# Patient Record
Sex: FEMALE | Race: WHITE | NOT HISPANIC OR LATINO | Employment: FULL TIME | URBAN - METROPOLITAN AREA
[De-identification: names, ages, dates, MRNs, and addresses within clinical notes are randomized per-mention and may not be internally consistent; named-entity substitution may affect disease eponyms.]

---

## 2021-04-29 ENCOUNTER — PATIENT MESSAGE (OUTPATIENT)
Dept: RESEARCH | Facility: HOSPITAL | Age: 41
End: 2021-04-29

## 2022-07-08 ENCOUNTER — PATIENT MESSAGE (OUTPATIENT)
Dept: FAMILY MEDICINE | Facility: CLINIC | Age: 42
End: 2022-07-08
Payer: COMMERCIAL

## 2022-12-23 ENCOUNTER — PATIENT MESSAGE (OUTPATIENT)
Dept: FAMILY MEDICINE | Facility: CLINIC | Age: 42
End: 2022-12-23
Payer: COMMERCIAL

## 2022-12-27 ENCOUNTER — OFFICE VISIT (OUTPATIENT)
Dept: UROLOGY | Facility: CLINIC | Age: 42
End: 2022-12-27

## 2022-12-27 VITALS — BODY MASS INDEX: 27.67 KG/M2 | HEIGHT: 66 IN | WEIGHT: 172.19 LBS

## 2022-12-27 DIAGNOSIS — N39.41 URGE INCONTINENCE: ICD-10-CM

## 2022-12-27 DIAGNOSIS — R35.0 URINARY FREQUENCY: ICD-10-CM

## 2022-12-27 DIAGNOSIS — Z90.5 HISTORY OF PARTIAL NEPHRECTOMY: ICD-10-CM

## 2022-12-27 DIAGNOSIS — R39.15 URINARY URGENCY: Primary | ICD-10-CM

## 2022-12-27 PROCEDURE — 99999 PR PBB SHADOW E&M-EST. PATIENT-LVL III: CPT | Mod: PBBFAC,,,

## 2022-12-27 PROCEDURE — 99213 OFFICE O/P EST LOW 20 MIN: CPT | Mod: PBBFAC,PO

## 2022-12-27 PROCEDURE — 99204 OFFICE O/P NEW MOD 45 MIN: CPT | Mod: S$PBB,,,

## 2022-12-27 PROCEDURE — 99999 PR PBB SHADOW E&M-EST. PATIENT-LVL III: ICD-10-PCS | Mod: PBBFAC,,,

## 2022-12-27 PROCEDURE — 99204 PR OFFICE/OUTPT VISIT, NEW, LEVL IV, 45-59 MIN: ICD-10-PCS | Mod: S$PBB,,,

## 2022-12-27 RX ORDER — OXYBUTYNIN CHLORIDE 5 MG/1
5 TABLET, EXTENDED RELEASE ORAL DAILY
Qty: 90 TABLET | Refills: 3 | Status: SHIPPED | OUTPATIENT
Start: 2022-12-27 | End: 2023-12-27

## 2022-12-27 NOTE — PROGRESS NOTES
[facility] Note  Staff: Mady Fuchs FNP-C    PCP: MD Edmund    Chief Complaint: Establish Care, History of Right Partial Nephrectomy    Subjective:        HPI: Twyla Ramos is a 42 y.o. female NEW PATIENT presents today to [facility]. She has a history of a right partial nephrectomy for RCC in 2019. This procedure was done in [place] and the details are not known or available. She never followed up with surgeon after the procedure. She has not had imaging done. 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 She denies dysuria, hematuria, fever, flank pain, and difficulty urinating. She does experience urgency, frequency, and urge incontinence. She used to take oxybutynin and she is requesting a new prescription. She denies a history of kidney stones. She denies a family history of  cancer. Her most recent labs from 11/28/22 show eGFR >80 and creat 0.6.     Questions asked pt during office visit today:  DTF: every 1-2 hours, NTF: 1-2 x night  Urgency:Yes , incontinence with urgency? Yes ;   Incontinence with laughing or straining: Yes;   DysuriaNo  Gross HematuriaNo  History of UTI: No    History of Kidney Stones?:  No    Constipation issues?:  No    REVIEW OF SYSTEMS:  Review of Systems   Constitutional: Negative.  Negative for chills and fever.   HENT: Negative.     Eyes: Negative.    Respiratory: Negative.     Cardiovascular: Negative.    Gastrointestinal: Negative.  Negative for abdominal pain, nausea and vomiting.   Genitourinary:  Positive for frequency and urgency. Negative for dysuria, flank pain and hematuria.   Musculoskeletal: Negative.  Negative for back pain.   Skin: Negative.    Neurological: Negative.    Endo/Heme/Allergies: Negative.    Psychiatric/Behavioral: Negative.       PMHx:  Past Medical History:   Diagnosis Date    Abnormal Pap smear 2001    cervical laser/HPV    Herpes genital        PSHx:  Past Surgical History:   Procedure  Laterality Date    CHOLECYSTECTOMY      left shoulder and elbow surgery  2012    polyp removal      colon age 5 and 10    THYROIDECTOMY, PARTIAL  2001    TUBAL LIGATION         Fam Hx:   malignancies: No , gyn malignancies: No   kidney stones: No     Soc Hx:  Lives in [place]    Allergies:  Azithromycin    Medications: reviewed     Objective:   There were no vitals filed for this visit.    Physical Exam  Constitutional:       Appearance: Normal appearance.   HENT:      Head: Normocephalic.      Mouth/Throat:      Mouth: Mucous membranes are moist.   Eyes:      Conjunctiva/sclera: Conjunctivae normal.   Pulmonary:      Effort: Pulmonary effort is normal.   Abdominal:      General: There is no distension.      Palpations: Abdomen is soft.      Tenderness: There is no abdominal tenderness. There is no right CVA tenderness or left CVA tenderness.   Musculoskeletal:         General: Normal range of motion.      Cervical back: Normal range of motion.   Skin:     General: Skin is warm.   Neurological:      Mental Status: She is alert and oriented to person, place, and time.   Psychiatric:         Mood and Affect: Mood normal.         Behavior: Behavior normal.         LABS REVIEW:  UA today:   Color:Clear, Yellow  Spec. Grav.  1.025  PH  5.5  Negative for leukocytes, nitrates, protein, glucose, ketones, urobili, bili  Small blood    Assessment:       1. Urinary urgency    2. Urinary frequency    3. Urge incontinence    4. History of partial nephrectomy          Plan:      CT abd and pelvis w wo contrast ordered and scheduled  Oxybutynin 5mg XL prescribed to pt today as trial to see if med improves pt's current LUTS.  Benefits, risks and side affects were thoroughly explained to pt today in office with all questions answered.  Order given to pt for pelvic floor PT    F/u Annually or As Needed per Treatment Plan    [facility]: Active    KAYLA Crain

## 2023-01-05 ENCOUNTER — HOSPITAL ENCOUNTER (OUTPATIENT)
Dept: RADIOLOGY | Facility: HOSPITAL | Age: 43
Discharge: HOME | End: 2023-01-05

## 2023-01-05 DIAGNOSIS — Z90.5 HISTORY OF PARTIAL NEPHRECTOMY: ICD-10-CM

## 2023-01-05 PROCEDURE — A9698 NON-RAD CONTRAST MATERIALNOC: HCPCS | Mod: PO

## 2023-01-05 PROCEDURE — 74177 CT ABDOMEN PELVIS WITH CONTRAST: ICD-10-PCS | Mod: 26,,, | Performed by: RADIOLOGY

## 2023-01-05 PROCEDURE — 25500020 PHARM REV CODE 255: Mod: PO

## 2023-01-05 PROCEDURE — 74178 CT ABD&PLV WO CNTR FLWD CNTR: CPT | Mod: TC,PO

## 2023-01-05 PROCEDURE — 74177 CT ABD & PELVIS W/CONTRAST: CPT | Mod: TC,PO

## 2023-01-05 PROCEDURE — 74177 CT ABD & PELVIS W/CONTRAST: CPT | Mod: 26,,, | Performed by: RADIOLOGY

## 2023-01-05 RX ADMIN — IOHEXOL 75 ML: 350 INJECTION, SOLUTION INTRAVENOUS at 02:01

## 2023-01-05 RX ADMIN — BARIUM SULFATE 900 ML: 20 SUSPENSION ORAL at 03:01

## 2023-01-06 ENCOUNTER — PATIENT MESSAGE (OUTPATIENT)
Dept: UROLOGY | Facility: CLINIC | Age: 43
End: 2023-01-06

## 2023-01-12 ENCOUNTER — HOSPITAL ENCOUNTER (OUTPATIENT)
Dept: RADIOLOGY | Facility: HOSPITAL | Age: 43
Discharge: HOME | End: 2023-01-12

## 2023-01-12 DIAGNOSIS — Z90.5 HISTORY OF PARTIAL NEPHRECTOMY: Primary | ICD-10-CM

## 2023-01-12 DIAGNOSIS — Z90.5 HISTORY OF PARTIAL NEPHRECTOMY: ICD-10-CM

## 2023-01-12 PROCEDURE — 74176 CT ABDOMEN PELVIS WITHOUT CONTRAST: ICD-10-PCS | Mod: 26,,, | Performed by: RADIOLOGY

## 2023-01-12 PROCEDURE — 74176 CT ABD & PELVIS W/O CONTRAST: CPT | Mod: TC,PO

## 2023-01-12 PROCEDURE — 74176 CT ABD & PELVIS W/O CONTRAST: CPT | Mod: 26,,, | Performed by: RADIOLOGY

## 2024-04-11 ENCOUNTER — OFFICE VISIT (OUTPATIENT)
Dept: UROLOGY | Facility: CLINIC | Age: 44
End: 2024-04-11
Payer: COMMERCIAL

## 2024-04-11 VITALS — WEIGHT: 186.06 LBS | HEIGHT: 66 IN | BODY MASS INDEX: 29.9 KG/M2

## 2024-04-11 DIAGNOSIS — Z90.5 HISTORY OF PARTIAL NEPHRECTOMY: ICD-10-CM

## 2024-04-11 DIAGNOSIS — A60.00 HERPES SIMPLEX INFECTION OF GENITOURINARY SYSTEM: ICD-10-CM

## 2024-04-11 DIAGNOSIS — R39.15 URINARY URGENCY: Primary | ICD-10-CM

## 2024-04-11 DIAGNOSIS — N32.81 OAB (OVERACTIVE BLADDER): ICD-10-CM

## 2024-04-11 DIAGNOSIS — Z72.0 TOBACCO ABUSE: ICD-10-CM

## 2024-04-11 LAB
BILIRUBIN, UA POC OHS: NEGATIVE
BLOOD, UA POC OHS: ABNORMAL
CLARITY, UA POC OHS: CLEAR
COLOR, UA POC OHS: YELLOW
GLUCOSE, UA POC OHS: NEGATIVE
KETONES, UA POC OHS: NEGATIVE
LEUKOCYTES, UA POC OHS: ABNORMAL
NITRITE, UA POC OHS: NEGATIVE
PH, UA POC OHS: 6
PROTEIN, UA POC OHS: NEGATIVE
SPECIFIC GRAVITY, UA POC OHS: 1.02
UROBILINOGEN, UA POC OHS: 0.2

## 2024-04-11 PROCEDURE — 99214 OFFICE O/P EST MOD 30 MIN: CPT | Mod: S$GLB,,,

## 2024-04-11 PROCEDURE — 99999 PR PBB SHADOW E&M-EST. PATIENT-LVL III: CPT | Mod: PBBFAC,,,

## 2024-04-11 PROCEDURE — 3008F BODY MASS INDEX DOCD: CPT | Mod: CPTII,S$GLB,,

## 2024-04-11 PROCEDURE — 1159F MED LIST DOCD IN RCRD: CPT | Mod: CPTII,S$GLB,,

## 2024-04-11 PROCEDURE — 1160F RVW MEDS BY RX/DR IN RCRD: CPT | Mod: CPTII,S$GLB,,

## 2024-04-11 PROCEDURE — 81003 URINALYSIS AUTO W/O SCOPE: CPT | Mod: QW,S$GLB,,

## 2024-04-11 RX ORDER — OXYBUTYNIN CHLORIDE 5 MG/1
5 TABLET, EXTENDED RELEASE ORAL DAILY
Qty: 30 TABLET | Refills: 11 | Status: SHIPPED | OUTPATIENT
Start: 2024-04-11 | End: 2025-04-11

## 2024-04-11 RX ORDER — VARENICLINE TARTRATE 0.5 (11)-1
KIT ORAL
Qty: 1 EACH | Refills: 0 | Status: SHIPPED | OUTPATIENT
Start: 2024-04-11 | End: 2024-05-28 | Stop reason: DRUGHIGH

## 2024-04-11 RX ORDER — VALACYCLOVIR HYDROCHLORIDE 1 G/1
1000 TABLET, FILM COATED ORAL DAILY
Qty: 30 TABLET | Refills: 11 | Status: SHIPPED | OUTPATIENT
Start: 2024-04-11 | End: 2025-04-11

## 2024-04-11 RX ORDER — VALACYCLOVIR HYDROCHLORIDE 500 MG/1
500 TABLET, FILM COATED ORAL
COMMUNITY

## 2024-04-11 NOTE — PROGRESS NOTES
[facility] Note  Staff: Mady Fuchs FNP-C    PCP:  None    Chief Complaint:  Annual exam    Subjective:        HPI: Twyla Ramos is a 43 y.o. female presents today for annual exam. She has a history of a right partial nephrectomy for RCC in 2019. This procedure was done in [place] and the details are not known or available. She never followed up with surgeon after the procedure. 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 Her last imaging was done 1/12/2023 which showed Prior partial right nephrectomy. Suture material seen at the upper pole surface. A focal mass is not identified on this study. The 10 mm area of decreased contrast enhancement noted on the prior study is not visualized today. No renal masses or stones are identified. I reviewed all imaging with her today.     She denies dysuria, hematuria, fever, flank pain, and difficulty urinating. She is requesting refills for oxybutynin for urgency and urge incontinence. She would like further evaluation for stress incontinence. She states she goes through 4-6 pads daily. She states she has to double her pads during the day. She denies a history of kidney stones. She denies a family history of  cancer. Her most recent labs from 11/28/22 show eGFR >80 and creat 0.6.     Questions asked pt during office visit today:  Urgency:Yes , incontinence with urgency? Yes ;  urgency controlled with ditropan  Incontinence with laughing or straining: Yes ;   DysuriaNo  Gross Hematuria No    History of Kidney Stones?:  No    Constipation issues?:  No      REVIEW OF SYSTEMS:  Review of Systems   Constitutional: Negative.  Negative for chills and fever.   HENT: Negative.     Eyes: Negative.    Respiratory: Negative.     Cardiovascular: Negative.    Gastrointestinal: Negative.  Negative for abdominal pain, constipation, diarrhea, nausea and vomiting.   Genitourinary:  Positive for frequency and urgency. Negative for dysuria, flank  pain and hematuria.   Musculoskeletal: Negative.  Negative for back pain.   Skin: Negative.    Neurological: Negative.    Endo/Heme/Allergies: Negative.    Psychiatric/Behavioral: Negative.         PMHx:  Past Medical History:   Diagnosis Date    Abnormal Pap smear 2001    cervical laser/HPV    Herpes genital        PSHx:  Past Surgical History:   Procedure Laterality Date    CHOLECYSTECTOMY      left shoulder and elbow surgery  2012    polyp removal      colon age 5 and 10    THYROIDECTOMY, PARTIAL  2001    TUBAL LIGATION         Fam Hx:   malignancies: No , gyn malignancies: No   kidney stones: No     Soc Hx:  Lives in [place]    Allergies:  Azithromycin    Medications: reviewed     Objective:   There were no vitals filed for this visit.    Physical Exam  Constitutional:       Appearance: Normal appearance.   HENT:      Head: Normocephalic.      Mouth/Throat:      Mouth: Mucous membranes are moist.   Eyes:      Conjunctiva/sclera: Conjunctivae normal.   Pulmonary:      Effort: Pulmonary effort is normal.   Abdominal:      General: There is no distension.      Palpations: Abdomen is soft.      Tenderness: There is no abdominal tenderness. There is no right CVA tenderness or left CVA tenderness.   Musculoskeletal:         General: Normal range of motion.      Cervical back: Normal range of motion.   Skin:     General: Skin is warm.   Neurological:      Mental Status: She is alert and oriented to person, place, and time.   Psychiatric:         Mood and Affect: Mood normal.         Behavior: Behavior normal.           LABS REVIEW:  UA today:   Color:Clear, Yellow  Spec. Grav.  1.020  PH  6.0  Negative for nitrates, protein, glucose, ketones, urobili, bili  Trace blood  Trace leuks    Assessment:       1. Urinary urgency    2. History of partial nephrectomy    3. OAB (overactive bladder)    4. Herpes simplex infection of genitourinary system    5. Tobacco abuse          Plan:      [facility] ordered  CT abdomen pelvis with  IV contrast ordered and scheduled  Refills for Ditropan 5 mg XL, Valtrex, and Chantix prescribed  Appointment scheduled with Dr. Briseno for stress incontinence    F/u as needed per treatment plan    [facility]:  Marcelina Fuchs, SIDRA-C

## 2024-04-16 ENCOUNTER — TELEPHONE (OUTPATIENT)
Dept: FAMILY MEDICINE | Facility: CLINIC | Age: 44
End: 2024-04-16
Payer: COMMERCIAL

## 2024-04-16 NOTE — TELEPHONE ENCOUNTER
----- Message from Irish Dahliajaiden sent at 4/16/2024 11:31 AM CDT -----  Regarding: sooner apt eca  Contact: pt  Type:  Sooner Appointment Request    Caller is requesting a sooner appointment.  Caller declined first available appointment listed below.  Caller will not accept being placed on the waitlist and is requesting a message be sent to doctor.    Name of Caller:  patient   When is the first available appointment?    Symptoms:  eca - annual   Would the patient rather a call back or a response via [facility]?  Best Call Back Number:  [phone]    Additional Information:  call to be seen thanks

## 2024-04-22 ENCOUNTER — PATIENT MESSAGE (OUTPATIENT)
Dept: UROLOGY | Facility: CLINIC | Age: 44
End: 2024-04-22
Payer: COMMERCIAL

## 2024-04-22 DIAGNOSIS — Z71.89 HISTORY OF PARTICIPATION IN SMOKING CESSATION COUNSELING: ICD-10-CM

## 2024-04-22 DIAGNOSIS — R11.0 NAUSEA: Primary | ICD-10-CM

## 2024-04-23 RX ORDER — ONDANSETRON 4 MG/1
4 TABLET, ORALLY DISINTEGRATING ORAL EVERY 8 HOURS PRN
Qty: 21 TABLET | Refills: 0 | Status: SHIPPED | OUTPATIENT
Start: 2024-04-23 | End: 2024-04-30

## 2024-05-10 ENCOUNTER — TELEPHONE (OUTPATIENT)
Dept: UROLOGY | Facility: CLINIC | Age: 44
End: 2024-05-10
Payer: COMMERCIAL

## 2024-05-10 NOTE — TELEPHONE ENCOUNTER
----- Message from Mady Fuchs NP sent at 5/10/2024  2:51 PM CDT -----  CT looks great- no recurrence seen

## 2024-05-10 NOTE — TELEPHONE ENCOUNTER
Called patient and reviewed result note with patient per NP Mady Fuchs. Patient verbalized understanding.

## 2024-05-27 ENCOUNTER — OFFICE VISIT (OUTPATIENT)
Dept: UROLOGY | Facility: CLINIC | Age: 44
End: 2024-05-27
Payer: COMMERCIAL

## 2024-05-27 VITALS — BODY MASS INDEX: 30.51 KG/M2 | WEIGHT: 189.81 LBS | HEIGHT: 66 IN

## 2024-05-27 DIAGNOSIS — N39.41 URGE INCONTINENCE: Primary | ICD-10-CM

## 2024-05-27 LAB
BILIRUBIN, UA POC OHS: NEGATIVE
BLOOD, UA POC OHS: ABNORMAL
CLARITY, UA POC OHS: ABNORMAL
COLOR, UA POC OHS: YELLOW
GLUCOSE, UA POC OHS: NEGATIVE
KETONES, UA POC OHS: NEGATIVE
LEUKOCYTES, UA POC OHS: ABNORMAL
NITRITE, UA POC OHS: POSITIVE
PH, UA POC OHS: 6.5
POC RESIDUAL URINE VOLUME: 0 ML (ref 0–100)
PROTEIN, UA POC OHS: NEGATIVE
SPECIFIC GRAVITY, UA POC OHS: 1.02
UROBILINOGEN, UA POC OHS: 0.2

## 2024-05-27 PROCEDURE — 51798 US URINE CAPACITY MEASURE: CPT | Mod: S$GLB,,, | Performed by: UROLOGY

## 2024-05-27 PROCEDURE — 87086 URINE CULTURE/COLONY COUNT: CPT | Performed by: UROLOGY

## 2024-05-27 PROCEDURE — 99214 OFFICE O/P EST MOD 30 MIN: CPT | Mod: S$GLB,,, | Performed by: UROLOGY

## 2024-05-27 PROCEDURE — 81003 URINALYSIS AUTO W/O SCOPE: CPT | Mod: QW,S$GLB,, | Performed by: UROLOGY

## 2024-05-27 PROCEDURE — 1159F MED LIST DOCD IN RCRD: CPT | Mod: CPTII,S$GLB,, | Performed by: UROLOGY

## 2024-05-27 PROCEDURE — 99999 PR PBB SHADOW E&M-EST. PATIENT-LVL III: CPT | Mod: PBBFAC,,, | Performed by: UROLOGY

## 2024-05-27 PROCEDURE — 3008F BODY MASS INDEX DOCD: CPT | Mod: CPTII,S$GLB,, | Performed by: UROLOGY

## 2024-05-28 ENCOUNTER — TELEPHONE (OUTPATIENT)
Dept: UROLOGY | Facility: CLINIC | Age: 44
End: 2024-05-28
Payer: COMMERCIAL

## 2024-05-28 DIAGNOSIS — N39.3 STRESS INCONTINENCE: Primary | ICD-10-CM

## 2024-05-28 RX ORDER — VARENICLINE TARTRATE 1 MG/1
1 TABLET, FILM COATED ORAL DAILY
Qty: 30 TABLET | Refills: 11 | Status: SHIPPED | OUTPATIENT
Start: 2024-05-28 | End: 2025-05-28

## 2024-05-29 ENCOUNTER — PATIENT MESSAGE (OUTPATIENT)
Dept: UROLOGY | Facility: CLINIC | Age: 44
End: 2024-05-29
Payer: COMMERCIAL

## 2024-05-29 LAB
BACTERIA UR CULT: NORMAL
BACTERIA UR CULT: NORMAL

## 2024-05-29 RX ORDER — NITROFURANTOIN 25; 75 MG/1; MG/1
100 CAPSULE ORAL 2 TIMES DAILY
Qty: 10 CAPSULE | Refills: 0 | Status: SHIPPED | OUTPATIENT
Start: 2024-05-29 | End: 2024-06-03

## 2024-07-01 RX ORDER — ONDANSETRON 4 MG/1
TABLET, ORALLY DISINTEGRATING ORAL
Qty: 21 TABLET | Refills: 0 | Status: SHIPPED | OUTPATIENT
Start: 2024-07-01

## 2024-07-24 ENCOUNTER — ANESTHESIA EVENT (OUTPATIENT)
Dept: SURGERY | Facility: HOSPITAL | Age: 44
End: 2024-07-24
Payer: COMMERCIAL

## 2024-07-24 NOTE — ANESTHESIA PREPROCEDURE EVALUATION
07/24/2024  Twyla Ramos is a 44 y.o., female.      Pre-op Assessment    I have reviewed the Patient Summary Reports.     I have reviewed the Nursing Notes. I have reviewed the NPO Status.   I have reviewed the Medications.     Review of Systems  Anesthesia Hx:             Denies Family Hx of Anesthesia complications.    Denies Personal Hx of Anesthesia complications.                      Physical Exam  General: Well nourished    Airway:  Mallampati: II   Mouth Opening: Normal  TM Distance: Normal    Chest/Lungs:  Normal Respiratory Rate    Anesthesia Plan  Type of Anesthesia, risks & benefits discussed:    Anesthesia Type: Gen ETT, Gen Supraglottic Airway  Intra-op Monitoring Plan: Standard ASA Monitors  Post Op Pain Control Plan: multimodal analgesia  Induction:  IV  Airway Plan: Video  Informed Consent: Informed consent signed with the Patient and all parties understand the risks and agree with anesthesia plan.  All questions answered.   ASA Score: 1  Day of Surgery Review of History & Physical: H&P Update referred to the surgeon/provider.    Ready For Surgery From Anesthesia Perspective.   .

## 2024-07-24 NOTE — PRE-PROCEDURE INSTRUCTIONS
Patient was informed of pre-procedure instructions and arrival time. Pt verbalized understanding and is to be accompanied by boyfriend.    Patient denies taking any over-the-counter vitamins, supplements, nsaids or aspirin products for 7 days.    Patient denies taking GLP-1 injection for 7 days.     Dear Twyla ,     You are scheduled for a procedure with Dr. Briseno on 7/25/2024. Your scheduled arrival time is 6:30 am.  This arrival time is roughly 2 hours before your anticipated procedure time to allow sufficient time for pre-op.  Please wear comfortable clothing  This procedure will take place at the [facility] at the corner of [facility] and [facility].  [facility] to [facility]. The address is:     [address] [place] [address].  [place] [id number]     After entering the building, proceed to the second floor and check in with registration.      Your fasting instructions are as follows:     Nothing to eat after 11:00 pm the evening before your surgery.   You may drink clear liquids up until 2 hours prior to your arrival time.      You MUST have a responsible adult to bring you home.     The morning of your procedure, please hold (do not take) the following medications:  The evening before and morning of your procedure, please hold the following medications:  -Aspirin and Aspirin-containing products (Goody's powder, Excedrin)  -NSAIDs (Advil, Ibuprofen, Aleve, Diclofenac)  -Vitamins/Supplements   -Herbal remedies/Teas  -Stimulants (Adderall, Vyvanse, Adipex)  -Diabetic medication (Please bring with you day of procedure)  -Prescription creams/gels/lotions        *May take Tylenol if needed         The evening before and morning of your procedure, take a shower using antibacterial soap (ex: Hibiclens or Dial antibacterial soap). DO NOT apply deodorant, lotion, cologne, or anything else to the skin. Do not wear jewelry or bring any valuables with you.  .      Please do not wear contact lenses the day of your procedure.   Bring any inhalers that you may need.     If you have any procedure-specific questions, please call your surgeon's office. Any other questions, don't hesitate to call at [phone]     Thanks,  Pre-Admit Testing  Anesthesia Dept [facility]

## 2024-07-25 ENCOUNTER — HOSPITAL ENCOUNTER (OUTPATIENT)
Facility: HOSPITAL | Age: 44
Discharge: HOME | End: 2024-07-25
Attending: UROLOGY | Admitting: UROLOGY
Payer: COMMERCIAL

## 2024-07-25 ENCOUNTER — ANESTHESIA (OUTPATIENT)
Dept: SURGERY | Facility: HOSPITAL | Age: 44
End: 2024-07-25
Payer: COMMERCIAL

## 2024-07-25 VITALS
TEMPERATURE: 98 F | OXYGEN SATURATION: 98 % | RESPIRATION RATE: 19 BRPM | BODY MASS INDEX: 29.99 KG/M2 | SYSTOLIC BLOOD PRESSURE: 103 MMHG | HEIGHT: 65 IN | WEIGHT: 180 LBS | HEART RATE: 87 BPM | DIASTOLIC BLOOD PRESSURE: 58 MMHG

## 2024-07-25 DIAGNOSIS — N39.3 STRESS INCONTINENCE: Primary | ICD-10-CM

## 2024-07-25 LAB
B-HCG UR QL: NEGATIVE
CTP QC/QA: YES

## 2024-07-25 PROCEDURE — 27201423 OPTIME MED/SURG SUP & DEVICES STERILE SUPPLY: Performed by: UROLOGY

## 2024-07-25 PROCEDURE — 71000033 HC RECOVERY, INTIAL HOUR: Performed by: UROLOGY

## 2024-07-25 PROCEDURE — 71000015 HC POSTOP RECOV 1ST HR: Performed by: UROLOGY

## 2024-07-25 PROCEDURE — 25000003 PHARM REV CODE 250: Performed by: UROLOGY

## 2024-07-25 PROCEDURE — 25000003 PHARM REV CODE 250: Performed by: NURSE ANESTHETIST, CERTIFIED REGISTERED

## 2024-07-25 PROCEDURE — 37000009 HC ANESTHESIA EA ADD 15 MINS: Performed by: UROLOGY

## 2024-07-25 PROCEDURE — C1771 REP DEV, URINARY, W/SLING: HCPCS | Performed by: UROLOGY

## 2024-07-25 PROCEDURE — 71000016 HC POSTOP RECOV ADDL HR: Performed by: UROLOGY

## 2024-07-25 PROCEDURE — 63600175 PHARM REV CODE 636 W HCPCS: Performed by: ANESTHESIOLOGY

## 2024-07-25 PROCEDURE — 37000008 HC ANESTHESIA 1ST 15 MINUTES: Performed by: UROLOGY

## 2024-07-25 PROCEDURE — 99900035 HC TECH TIME PER 15 MIN (STAT)

## 2024-07-25 PROCEDURE — 36000706: Performed by: UROLOGY

## 2024-07-25 PROCEDURE — 63600175 PHARM REV CODE 636 W HCPCS: Performed by: UROLOGY

## 2024-07-25 PROCEDURE — 81025 URINE PREGNANCY TEST: CPT | Performed by: UROLOGY

## 2024-07-25 PROCEDURE — 94761 N-INVAS EAR/PLS OXIMETRY MLT: CPT

## 2024-07-25 PROCEDURE — 63600175 PHARM REV CODE 636 W HCPCS: Performed by: NURSE ANESTHETIST, CERTIFIED REGISTERED

## 2024-07-25 PROCEDURE — 36000707: Performed by: UROLOGY

## 2024-07-25 PROCEDURE — 57288 REPAIR BLADDER DEFECT: CPT | Mod: ,,, | Performed by: UROLOGY

## 2024-07-25 DEVICE — SUPRAPUBIC MID-URETHRAL SLING
Type: IMPLANTABLE DEVICE | Site: URETHRA | Status: FUNCTIONAL
Brand: LYNX ULTRA SYSTEM

## 2024-07-25 RX ORDER — KETOROLAC TROMETHAMINE 30 MG/ML
INJECTION, SOLUTION INTRAMUSCULAR; INTRAVENOUS
Status: DISCONTINUED | OUTPATIENT
Start: 2024-07-25 | End: 2024-07-25

## 2024-07-25 RX ORDER — LIDOCAINE HYDROCHLORIDE 20 MG/ML
INJECTION INTRAVENOUS
Status: DISCONTINUED | OUTPATIENT
Start: 2024-07-25 | End: 2024-07-25

## 2024-07-25 RX ORDER — ACETAMINOPHEN 10 MG/ML
INJECTION, SOLUTION INTRAVENOUS
Status: DISCONTINUED | OUTPATIENT
Start: 2024-07-25 | End: 2024-07-25

## 2024-07-25 RX ORDER — GLUCAGON 1 MG
1 KIT INJECTION
Status: DISCONTINUED | OUTPATIENT
Start: 2024-07-25 | End: 2024-07-25 | Stop reason: HOSPADM

## 2024-07-25 RX ORDER — HYDROMORPHONE HYDROCHLORIDE 1 MG/ML
0.2 INJECTION, SOLUTION INTRAMUSCULAR; INTRAVENOUS; SUBCUTANEOUS EVERY 5 MIN PRN
Status: DISCONTINUED | OUTPATIENT
Start: 2024-07-25 | End: 2024-07-25 | Stop reason: HOSPADM

## 2024-07-25 RX ORDER — LIDOCAINE HYDROCHLORIDE AND EPINEPHRINE 10; 10 MG/ML; UG/ML
INJECTION, SOLUTION INFILTRATION; PERINEURAL
Status: DISCONTINUED | OUTPATIENT
Start: 2024-07-25 | End: 2024-07-25 | Stop reason: HOSPADM

## 2024-07-25 RX ORDER — LIDOCAINE HYDROCHLORIDE 10 MG/ML
1 INJECTION, SOLUTION EPIDURAL; INFILTRATION; INTRACAUDAL; PERINEURAL ONCE AS NEEDED
Status: DISCONTINUED | OUTPATIENT
Start: 2024-07-25 | End: 2024-07-25 | Stop reason: HOSPADM

## 2024-07-25 RX ORDER — FENTANYL CITRATE 50 UG/ML
25 INJECTION, SOLUTION INTRAMUSCULAR; INTRAVENOUS EVERY 5 MIN PRN
Status: DISCONTINUED | OUTPATIENT
Start: 2024-07-25 | End: 2024-07-25 | Stop reason: HOSPADM

## 2024-07-25 RX ORDER — OXYCODONE AND ACETAMINOPHEN 5; 325 MG/1; MG/1
1 TABLET ORAL
Status: DISCONTINUED | OUTPATIENT
Start: 2024-07-25 | End: 2024-07-25 | Stop reason: HOSPADM

## 2024-07-25 RX ORDER — ONDANSETRON HYDROCHLORIDE 2 MG/ML
4 INJECTION, SOLUTION INTRAVENOUS DAILY PRN
Status: DISCONTINUED | OUTPATIENT
Start: 2024-07-25 | End: 2024-07-25 | Stop reason: HOSPADM

## 2024-07-25 RX ORDER — MEPERIDINE HYDROCHLORIDE 50 MG/ML
25 INJECTION INTRAMUSCULAR; INTRAVENOUS; SUBCUTANEOUS ONCE
Status: COMPLETED | OUTPATIENT
Start: 2024-07-25 | End: 2024-07-25

## 2024-07-25 RX ORDER — SODIUM CHLORIDE 9 MG/ML
INJECTION, SOLUTION INTRAVENOUS CONTINUOUS
Status: DISCONTINUED | OUTPATIENT
Start: 2024-07-25 | End: 2024-07-25 | Stop reason: HOSPADM

## 2024-07-25 RX ORDER — PROCHLORPERAZINE EDISYLATE 5 MG/ML
5 INJECTION INTRAMUSCULAR; INTRAVENOUS EVERY 30 MIN PRN
Status: DISCONTINUED | OUTPATIENT
Start: 2024-07-25 | End: 2024-07-25 | Stop reason: HOSPADM

## 2024-07-25 RX ORDER — HYDROCODONE BITARTRATE AND ACETAMINOPHEN 5; 325 MG/1; MG/1
1 TABLET ORAL EVERY 6 HOURS PRN
Qty: 12 TABLET | Refills: 0 | Status: SHIPPED | OUTPATIENT
Start: 2024-07-25 | End: 2024-07-28

## 2024-07-25 RX ORDER — FENTANYL CITRATE 50 UG/ML
INJECTION, SOLUTION INTRAMUSCULAR; INTRAVENOUS
Status: DISCONTINUED | OUTPATIENT
Start: 2024-07-25 | End: 2024-07-25

## 2024-07-25 RX ORDER — SODIUM CHLORIDE 9 MG/ML
INJECTION, SOLUTION INTRAVENOUS CONTINUOUS PRN
Status: DISCONTINUED | OUTPATIENT
Start: 2024-07-25 | End: 2024-07-25

## 2024-07-25 RX ORDER — PROPOFOL 10 MG/ML
INJECTION, EMULSION INTRAVENOUS
Status: DISCONTINUED | OUTPATIENT
Start: 2024-07-25 | End: 2024-07-25

## 2024-07-25 RX ORDER — DEXAMETHASONE SODIUM PHOSPHATE 4 MG/ML
INJECTION, SOLUTION INTRA-ARTICULAR; INTRALESIONAL; INTRAMUSCULAR; INTRAVENOUS; SOFT TISSUE
Status: DISCONTINUED | OUTPATIENT
Start: 2024-07-25 | End: 2024-07-25

## 2024-07-25 RX ORDER — ONDANSETRON HYDROCHLORIDE 2 MG/ML
INJECTION, SOLUTION INTRAVENOUS
Status: DISCONTINUED | OUTPATIENT
Start: 2024-07-25 | End: 2024-07-25

## 2024-07-25 RX ORDER — MIDAZOLAM HYDROCHLORIDE 1 MG/ML
INJECTION INTRAMUSCULAR; INTRAVENOUS
Status: DISCONTINUED | OUTPATIENT
Start: 2024-07-25 | End: 2024-07-25

## 2024-07-25 RX ADMIN — MIDAZOLAM HYDROCHLORIDE 2 MG: 1 INJECTION, SOLUTION INTRAMUSCULAR; INTRAVENOUS at 08:07

## 2024-07-25 RX ADMIN — MEPERIDINE HYDROCHLORIDE 25 MG: 50 INJECTION INTRAMUSCULAR; INTRAVENOUS; SUBCUTANEOUS at 09:07

## 2024-07-25 RX ADMIN — SODIUM CHLORIDE: 0.9 INJECTION, SOLUTION INTRAVENOUS at 08:07

## 2024-07-25 RX ADMIN — KETOROLAC TROMETHAMINE 30 MG: 30 INJECTION, SOLUTION INTRAMUSCULAR; INTRAVENOUS at 08:07

## 2024-07-25 RX ADMIN — FENTANYL CITRATE 50 MCG: 50 INJECTION, SOLUTION INTRAMUSCULAR; INTRAVENOUS at 08:07

## 2024-07-25 RX ADMIN — FENTANYL CITRATE 25 MCG: 50 INJECTION, SOLUTION INTRAMUSCULAR; INTRAVENOUS at 08:07

## 2024-07-25 RX ADMIN — DEXAMETHASONE SODIUM PHOSPHATE 4 MG: 4 INJECTION INTRA-ARTICULAR; INTRALESIONAL; INTRAMUSCULAR; INTRAVENOUS; SOFT TISSUE at 08:07

## 2024-07-25 RX ADMIN — SODIUM CHLORIDE, SODIUM GLUCONATE, SODIUM ACETATE, POTASSIUM CHLORIDE, MAGNESIUM CHLORIDE, SODIUM PHOSPHATE, DIBASIC, AND POTASSIUM PHOSPHATE: .53; .5; .37; .037; .03; .012; .00082 INJECTION, SOLUTION INTRAVENOUS at 09:07

## 2024-07-25 RX ADMIN — LIDOCAINE HYDROCHLORIDE 100 MG: 20 INJECTION INTRAVENOUS at 08:07

## 2024-07-25 RX ADMIN — ACETAMINOPHEN 1000 MG: 10 INJECTION INTRAVENOUS at 08:07

## 2024-07-25 RX ADMIN — PROCHLORPERAZINE EDISYLATE 5 MG: 5 INJECTION INTRAMUSCULAR; INTRAVENOUS at 09:07

## 2024-07-25 RX ADMIN — ONDANSETRON 4 MG: 2 INJECTION INTRAMUSCULAR; INTRAVENOUS at 08:07

## 2024-07-25 RX ADMIN — CEFAZOLIN 2 G: 2 INJECTION, POWDER, FOR SOLUTION INTRAMUSCULAR; INTRAVENOUS at 08:07

## 2024-07-25 RX ADMIN — PROPOFOL 200 MG: 10 INJECTION, EMULSION INTRAVENOUS at 08:07

## 2024-07-25 NOTE — ANESTHESIA POSTPROCEDURE EVALUATION
Anesthesia Post Evaluation    Patient: Twyla Ramos    Procedure(s) Performed: Procedure(s) (LRB):  URETHROPEXY, RETROPUBIC APPROACH, USING MIDURETHRAL SLING (N/A)  CYSTOSCOPY (N/A)    Final Anesthesia Type: general      Patient location during evaluation: PACU  Patient participation: Yes- Able to Participate  Level of consciousness: awake and alert  Post-procedure vital signs: reviewed and stable  Pain management: adequate  Airway patency: patent    PONV status at discharge: No PONV  Anesthetic complications: no      Cardiovascular status: stable  Respiratory status: spontaneous ventilation  Hydration status: euvolemic  Follow-up not needed.          Vitals Value Taken Time   /61 07/25/24 1031   Temp 36.8 °C (98.2 °F) 07/25/24 0940   Pulse 76 07/25/24 1037   Resp 11 07/25/24 1037   SpO2 96 % 07/25/24 1037   Vitals shown include unfiled device data.      Event Time   Out of Recovery 10:05:00         Pain/Olga Score: Pain Rating Prior to Med Admin: 2 (7/25/2024  9:39 AM)  Olga Score: 10 (7/25/2024 10:00 AM)

## 2024-07-25 NOTE — PROGRESS NOTES
Received to [facility] via stretcher, awake and alert. Reports mild bladder discomfort.  Noted shivering post anesthesia.  VSS.  Discussed with Dr. Yeager.  Orders received.  See nursing assessment.

## 2024-07-25 NOTE — DISCHARGE SUMMARY
[facility]  Discharge Note  Short Stay    Procedure(s) (LRB):  URETHROPEXY, RETROPUBIC APPROACH, USING MIDURETHRAL SLING (N/A)  CYSTOSCOPY (N/A)      OUTCOME: Patient tolerated treatment/procedure well without complication and is now ready for discharge.    DISPOSITION: Home or Self Care    FINAL DIAGNOSIS:  Stress Incontinence     FOLLOWUP: In clinic    DISCHARGE INSTRUCTIONS:  No discharge procedures on file.     TIME SPENT ON DISCHARGE: 15 minutes  
declines

## 2024-07-25 NOTE — PLAN OF CARE
Chart reviewed. Preop nursing care completed per orders. Safe surgery checklist complete. Pt denies any open wounds cuts or sores. Pt denies any metal in body. Belongings secured in PACU locker 5. Waiting for  H&P, H&P update, admit order, anesthesia consent, surgical consent prior to surgery. Pt AAOX3, VSS on room air. Pt toileted, Bed locked in lowest position, Call light within reach. Pt denies any needs at this time. Will continue to monitor.

## 2024-07-25 NOTE — H&P
[facility]        History and Physical     7/25/2024     Referred by:  No ref. provider found     HPI: Twyla Ramos is a very pleasant 44 y.o. female who has not previously been seen by an [facility] specialist in our department referred for evaluation of urinary incontinence of several years duration. She reports stress incontinence associated with coughing laughing sneezing exercise or other exertional activities. She reports  some urgency that occasionally results in UUI . She requires daily pads (4 pads/day). She reports urinary incontinence is only during the day.      She denies symptoms of irritative voiding including dysuria. She denies symptoms of obstructive voiding including decreased stream, hesitancy, intermittency, post void dribbling, and sense of incomplete emptying. Bladder scan PVR was 0 mL.  Her history includes no notation of urolithiasis, hematuria, prior pelvic surgery, previous prolapse or incontinence procedures or neurological symptoms/diagnoses. She denies all other prior pelvic surgeries or neurologic diagnoses. She does not report symptoms suggestive of advanced POP.         Previous treatments for her stress incontinence have included:   pelvic floor exercises without therapy     A review of 10+ systems was conducted with pertinent positive and negative findings documented in HPI with all other systems reviewed and negative.     Past medical, family, surgical and social history reviewed as documented in chart with pertinent positive medical, family, surgical and social history detailed in HPI.     Exam Findings:     Vaginal Mucosa: normal  Anterior: none  Apical: no apical descent  Posterior: none  DANYEL: severe DANYEL  Urethral Mobility: urethral hypermobility  Urethral Lesions: no lesions or masses Gen: no acute distress, conversant  Eyes: anicteric, extraocular muscles intact  Lungs: normal inspiration, no retractions  GI: soft, non-tender, no distension  Psych: appropriate  affect, alert and oriented         Assessment/Plan:     Stress Urinary Incontinence (new, addt'l workup): She reports urinary incontinence suggestive of DANYEL which the patient feels is bothersome enough to warrant further therapy. She believes DANYEL is predominant.  Her previous treatments for DANYEL have included pelvic floor exercises without therapy. There are not reported symptoms of voiding difficulty. There is not a history suspicious for neurogenic bladder or voiding dysfunction.      Patient will be scheduled for Retropubic midurethral sling in the OR. We discussed that Her risks factors for sling complications are not increased over those of most patients. Specific risks discussed included urinary retention, continued urinary incontinence (both stress and urgency incontinence), development of new incontinence, vaginal exposure or sling, perforation of the bladder, urethra or bowel and anesthetic risks. Surgical alternatives such as urethral bulking and pubovaginal sling were discussed including the limitations and risks of these procedures. Non-surgical alternatives including additional pelvic floor exercises and pessary-type devices were discussed as well. The patient asked multiple appropriate questions indicating an understanding of the risks and benefits involved with this surgery. She would like to proceed.

## 2024-07-25 NOTE — TRANSFER OF CARE
"Anesthesia Transfer of Care Note    Patient: Twyla Ramos    Procedure(s) Performed: Procedure(s) (LRB):  URETHROPEXY, RETROPUBIC APPROACH, USING MIDURETHRAL SLING (N/A)  CYSTOSCOPY (N/A)    Patient location: PACU    Anesthesia Type: general    Transport from OR: Transported from OR on room air with adequate spontaneous ventilation    Post pain: adequate analgesia    Post assessment: no apparent anesthetic complications and tolerated procedure well    Post vital signs: stable    Level of consciousness: alert, awake and oriented    Nausea/Vomiting: no nausea/vomiting    Complications: none    Transfer of care protocol was followed      Last vitals: Visit Vitals  /79 (BP Location: Right arm, Patient Position: Lying)   Pulse 73   Temp 36.4 °C (97.5 °F) (Temporal)   Resp 20   Ht 5' 5" (1.651 m)   Wt 81.6 kg (180 lb)   LMP  (Approximate)   SpO2 100%   Breastfeeding No   BMI 29.95 kg/m²     "

## 2024-07-25 NOTE — PLAN OF CARE
Patient up to void.  Noted blood tinged urine.  Reports no difficulty urinating or pain.  Post residual volume acceptable for discharge - 1cc.

## 2024-07-25 NOTE — ANESTHESIA PROCEDURE NOTES
Intubation    Date/Time: 7/25/2024 8:26 AM    Performed by: Ana Varela CRNA  Authorized by: Norberto Yeager MD    Intubation:     Induction:  Intravenous    Intubated:  Postinduction    Mask Ventilation:  Easy mask    Attempts:  1    Attempted By:  CRNA    Difficult Airway Encountered?: No      Complications:  None    Airway Device:  Supraglottic airway/LMA    Airway Device Size:  4.0    Style/Cuff Inflation:  Cuffed    Secured at:  The lips    Placement Verified By:  Capnometry    Complicating Factors:  None    Findings Post-Intubation:  BS equal bilateral and atraumatic/condition of teeth unchanged

## 2024-07-25 NOTE — OP NOTE
Mid-Urethral Sling Operative Note  2024    Preoperative Diagnosis:   Stress Urinary Incontinence    Postoperative Diagnosis:  Stress Urinary Incontinence    Procedure:  Retropubic mid-urethral synthetic sling (30890)  Cystourethroscopy    Attending Surgeon: Neptali Briseno MD    Anesthesia: Laryngeal Mask Airway    EBL: 50 mL    Complications: None    Findings: Normal Cystourethroscopy    Drains: None    Reason for procedure: Twyla Ramos is a very pleasant 44 y.o. female who presented with a history of stress urinary incontinence. After trials of more conservative therapy including pelvic floor exercises, she elected to undergo a retropubic mid-urethral synthetic sling as primary treatment of her incontinence. We discussed in detail the risk and benefits of this procedure including continued incontinence, urethral or bladder perforation, bowel perforation, voiding dysfunction or urinary retention, and vaginal exposure of the sling.     Procedure in detail:  Informed consent was obtained by explaining all risks and benefits of the procedure to the patient in detail.  After informed consent, the patient was brought to the OR where Laryngeal Mask Airway anesthesia was administered by the anesthesia staff. Appropriate perioperative antibiotics were given within 30 minutes of beginning the procedure. A formal timeout was performed prior to the procedure. After induction, the patient was gently placed in lithotomy position with all pressure points padded. Bilateral sequential compression devices were applied and activated prior to induction. The patient was prepped and draped in standard fashion.    We confirmed that the synthetic sling was present, was not  and appeared ready for implant.     A 16-Ukrainian Guy catheter was placed to gravity and a weighted vaginal speculum was placed to gravity. After identifying the mid-urethra, 10 ml of 0.25% marcaine with epinephrine was injected into the overlying  vaginal mucosa. A midline vaginal incision was made and bilateral vaginal wall flaps were dissected on either side of the urethra up to the level of the endopelvic fascia. The fascia was not perforated.     The planned insertion site for the Lyn[employer] Ultra [employer]) retropubic mid-urethral sling was identified approximately 2 cm lateral to the midline just above the pubic sympysis. The trocars were guided along the posterior surface of the pubic bone until felt by fingers placed in the incisions below. The trocars were then passed into the incisions with digital guidance. This was performed bilaterally in an identical fashion. Flexible cystourethroscopy was then performed with confirmation that there was no perforation of the bladder and that needles moved independently of the overlying detrusor. The scope was withdrawn. The 16-Lithuanian Guy catheter was returned to gravity.     The retropubic sling arms were then attached to the trocars which were then pulled back through the incision. The lack of tension on the sling was achieved by placing a large Monika clamp between the urethra and sling and confirmed by observing a small gap between the sling and urethra. After irrigation, the vaginal mucosa was carefully closed in a running fashion using a 2-0 vicryl suture. Hemostasis was confirmed. Skin puncture sites were covered with Dermabond. The catheter was removed to facilitate voiding trial in the recovery room.     She tolerated the procedure well and was extubated and transferred in stable condition to the recovery room. All counts were confirmed correct.     We will plan to see her back in 6-8 weeks for continued evaluation.

## 2024-09-09 ENCOUNTER — OFFICE VISIT (OUTPATIENT)
Dept: UROLOGY | Facility: CLINIC | Age: 44
End: 2024-09-09
Payer: COMMERCIAL

## 2024-09-09 VITALS — WEIGHT: 194.25 LBS | HEIGHT: 65 IN | BODY MASS INDEX: 32.36 KG/M2

## 2024-09-09 DIAGNOSIS — N39.3 STRESS INCONTINENCE: Primary | ICD-10-CM

## 2024-09-09 LAB
BILIRUBIN, UA POC OHS: NEGATIVE
BLOOD, UA POC OHS: NEGATIVE
CLARITY, UA POC OHS: CLEAR
COLOR, UA POC OHS: YELLOW
GLUCOSE, UA POC OHS: NEGATIVE
KETONES, UA POC OHS: NEGATIVE
LEUKOCYTES, UA POC OHS: NEGATIVE
NITRITE, UA POC OHS: NEGATIVE
PH, UA POC OHS: 6
POC RESIDUAL URINE VOLUME: 192 ML (ref 0–100)
PROTEIN, UA POC OHS: NEGATIVE
SPECIFIC GRAVITY, UA POC OHS: 1.01
UROBILINOGEN, UA POC OHS: 0.2

## 2024-09-09 PROCEDURE — 1159F MED LIST DOCD IN RCRD: CPT | Mod: CPTII,S$GLB,, | Performed by: UROLOGY

## 2024-09-09 PROCEDURE — 51798 US URINE CAPACITY MEASURE: CPT | Mod: S$GLB,,, | Performed by: UROLOGY

## 2024-09-09 PROCEDURE — 99024 POSTOP FOLLOW-UP VISIT: CPT | Mod: S$GLB,,, | Performed by: UROLOGY

## 2024-09-09 PROCEDURE — 81003 URINALYSIS AUTO W/O SCOPE: CPT | Mod: QW,S$GLB,, | Performed by: UROLOGY

## 2024-09-09 PROCEDURE — 99999 PR PBB SHADOW E&M-EST. PATIENT-LVL III: CPT | Mod: PBBFAC,,, | Performed by: UROLOGY

## 2024-09-09 RX ORDER — TRETINOIN 0.25 MG/G
CREAM TOPICAL
COMMUNITY
Start: 2024-08-14

## 2024-09-09 NOTE — PROGRESS NOTES
[facility]      Return Stress Incontinence Note    9/9/2024    Referred by:  No ref. provider found    History of Present Illness    Ms. Ramos presents today for follow up 6 weeks following MUS for DANYEL.    She reports a recent episode of painful urination while traveling, possibly due to inadequate hydration. The symptoms resolved without intervention.     She denies any new urinary frequency or urgency. No voiding difficulty including hesitancy, straining or intermittency. PVR today was not true PVR.     She has discontinued her urgency medication without experiencing any worsening of symptoms. In fact, she notes significant improvement in both urgency and stress incontinence. She now uses only one pad per day, which may be slightly damp by the end of the day. She reports much better control with coughing and sneezing.    She expresses interest in trying the Bloom device, which her company offers for free, to potentially improve her pelvic floor strength and address remaining incontinence issues.    Her 19-year-old daughter has pelvic floor issues and is currently under medical care for this condition.        A review of 10+ systems was conducted with pertinent positive and negative findings documented in HPI with all other systems reviewed and negative.    Past medical, family, surgical and social history reviewed as documented in chart with pertinent positive medical, family, surgical and social history detailed in HPI.      Exam Findings:    Physical Exam    General: No acute distress. Nontoxic appearing.  HENT: Normocephalic. Atraumatic.  Respiratory: Normal respiratory effort. No conversational dyspnea. No audible wheezing.  Abdomen: No obvious distension.  Skin: No visible abnormalities.  Extremities: No edema upper extremities. No edema lower extremities.  Neurological: Alert and oriented x3. Normal speech.  Psychiatric: Normal mood. Normal affect. No evidence of SI.  Female  Genitourinary: Incision site healed well, no visible incision. Minimal urethral movement on cough. No urinary leakage on cough.          Assessment/Plan:    Assessment & Plan    - Assessed patient's progress post-procedure, noting significant improvement in both stress and urgency incontinence symptoms  - Evaluated healing of surgical incision site during physical exam  - Considered Morgan device as a potential non-pharmacological treatment option for further improvement of pelvic floor symptoms    PELVIC FLOOR SYMPTOMS:  - Ms. Ramos to try using the Bloom device for pelvic floor exercises.    FOLLOW UP:  - Follow up in 6 months for reassessment.  - Contact the office if any issues arise before the scheduled follow-up.

## 2025-05-12 ENCOUNTER — OFFICE VISIT (OUTPATIENT)
Dept: PODIATRY | Facility: CLINIC | Age: 45
End: 2025-05-12
Payer: COMMERCIAL

## 2025-05-12 VITALS — BODY MASS INDEX: 32.36 KG/M2 | WEIGHT: 194.25 LBS | HEIGHT: 65 IN

## 2025-05-12 DIAGNOSIS — M62.462 GASTROCNEMIUS EQUINUS OF BOTH LOWER EXTREMITIES: ICD-10-CM

## 2025-05-12 DIAGNOSIS — M62.461 GASTROCNEMIUS EQUINUS OF BOTH LOWER EXTREMITIES: ICD-10-CM

## 2025-05-12 DIAGNOSIS — M72.2 PLANTAR FASCIITIS: Primary | ICD-10-CM

## 2025-05-12 PROCEDURE — 99999 PR PBB SHADOW E&M-EST. PATIENT-LVL III: CPT | Mod: PBBFAC,,, | Performed by: PODIATRIST

## 2025-05-12 PROCEDURE — 99204 OFFICE O/P NEW MOD 45 MIN: CPT | Mod: S$GLB,,, | Performed by: PODIATRIST

## 2025-05-12 PROCEDURE — 1159F MED LIST DOCD IN RCRD: CPT | Mod: CPTII,S$GLB,, | Performed by: PODIATRIST

## 2025-05-12 PROCEDURE — 3008F BODY MASS INDEX DOCD: CPT | Mod: CPTII,S$GLB,, | Performed by: PODIATRIST

## 2025-05-12 NOTE — PROGRESS NOTES
Subjective:     Patient ID: Twyla Ramos is a 44 y.o. female.    Chief Complaint: Foot Pain (Bl foot pain rt foot top/big toe area and the arch area left heel)    Twyla is a 44 y.o. female with the chief complaint of bilateral foot pain, Rt. > Lt., that has been present for the past several months.  Describes pain as sharp and akin to walking on gravel.  Suspects pain in the Lt. Foot is secondary to compensation.  Rates pain as an 8/10.  Symptoms are aggravated with initial weight bearing and after prolonged periods of weight bearing.  She has attempted to wear supportive shoe gear, and typically wears birkenstock sandals around her home.  Denies sustaining injury to the affected limbs.  Denies any additional pedal complaints.      Past Medical History:   Diagnosis Date    Abnormal Pap smear 2001    cervical laser/HPV    Herpes genital        Past Surgical History:   Procedure Laterality Date    CHOLECYSTECTOMY      CYSTOSCOPY N/A 7/25/2024    Procedure: CYSTOSCOPY;  Surgeon: Neptali Briseno MD;  Location: [facility] OR;  Service: Urology;  Laterality: N/A;    left shoulder and elbow surgery  01/01/2012    PARTIAL NEPHRECTOMY Right 2019    polyp removal      colon age 5 and 10    THYROIDECTOMY, PARTIAL  01/01/2001    TUBAL LIGATION      URETHROPEXY USING MIDURETHRAL SLING BY RETROPUBIC APPROACH N/A 7/25/2024    Procedure: URETHROPEXY, RETROPUBIC APPROACH, USING MIDURETHRAL SLING;  Surgeon: Neptali Briseno MD;  Location: [facility] OR;  Service: Urology;  Laterality: N/A;  1 hour 20 minutes       Family History   Problem Relation Name Age of Onset    Breast cancer Neg Hx      Ovarian cancer Neg Hx      Stroke Neg Hx      Hypertension Neg Hx      Colon cancer Paternal Grandmother         Social History     Socioeconomic History    Marital status: Single   Tobacco Use    Smoking status: Every Day     Current packs/day: 0.50     Average packs/day: 0.5 packs/day for 13.0 years (6.5 ttl pk-yrs)     Types: Cigarettes     Smokeless tobacco: Never   Substance and Sexual Activity    Alcohol use: Yes     Comment: Socially    Drug use: No    Sexual activity: Yes     Partners: Male     Birth control/protection: Surgical     Social Drivers of Health     Financial Resource Strain: Medium Risk (11/22/2022)    Received from [facility]    Financial Resource Strain     Financial Concerns: 2   Transportation Needs: Unknown (11/22/2022)    Received from [facility]    Transportation Needs     Lack of Transportation (Medical): 0   Physical Activity: Unknown (11/22/2022)    Received from [facility]    Physical Activity     Calculated Minutes of Exercise per Week:: 0   Stress: Unknown (11/22/2022)    Received from [facility]    Stress     Patient Reported Major Stressor(s): 0     Patient Reported Strengths: 1     Patient Reported Source(s) of Support: 1   Housing Stability: Unknown (11/22/2022)    Received from [facility]    Housing Stability     Housing Concerns: 0       Current Medications[1]    Review of patient's allergies indicates:   Allergen Reactions    Azithromycin Diarrhea        Review of Systems   Constitutional: Negative for chills and fever.   Cardiovascular:  Negative for claudication and leg swelling.   Skin:  Negative for color change and nail changes.   Musculoskeletal:  Positive for myalgias. Negative for muscle cramps and muscle weakness.   Gastrointestinal:  Negative for nausea and vomiting.   Neurological:  Negative for numbness and paresthesias.   Psychiatric/Behavioral:  Negative for altered mental status.       Objective:     Physical Exam  Constitutional:       Appearance: Normal appearance. She is normal weight.   Cardiovascular:      Pulses:           Dorsalis pedis pulses are 2+ on the right side and 2+ on the left side.        Posterior tibial pulses are 2+ on the right side and 2+ on the left side.       Comments: CFT is < 3 seconds bilateral.  Pedal hair growth is present bilateral.  No lower extremity edema noted bilateral.  Toes are warm to touch bilateral.    Musculoskeletal:         General: Tenderness and deformity present. No signs of injury.      Right lower leg: No edema.      Left lower leg: No edema.      Comments: Muscle strength 5/5 in all muscle groups bilateral.  No tenderness nor crepitation with ROM of foot/ankle joints bilateral.  Pain with palpation the length of the medial band of the Rt. Plantar fascia and to a lesser extent on the Lt. Side.  Mild pain with palpation at the insertion of the Rt. Peroneus brevis tendon.  Bilateral gastrocnemius equinus.     Skin:     Capillary Refill: Capillary refill takes 2 to 3 seconds.      Findings: No bruising, ecchymosis, erythema, signs of injury, laceration, lesion, petechiae, rash or wound.      Comments: Pedal skin has normal turgor, temperature, and texture bilateral.  Toenails x 10 appear normotrophic. Examination of the skin reveals no evidence of significant maceration, rashes, open lesions, suspicious appearing nevi or other concerning lesions.       Neurological:      General: No focal deficit present.      Mental Status: She is alert.      Sensory: No sensory deficit.      Motor: No weakness or atrophy.      Comments: Light touch is intact bilateral.           Assessment:      Encounter Diagnoses   Name Primary?    Plantar fasciitis Yes    Gastrocnemius equinus of both lower extremities      Plan:     Twyla was seen today for foot pain.    Diagnoses and all orders for this visit:    Plantar fasciitis  -     Ambulatory Referral/Consult to Physical Therapy; Future    Gastrocnemius equinus of both lower extremities  -     Ambulatory Referral/Consult to Physical Therapy; Future      I counseled the patient on her conditions, their implications and medical management.       - Discussed performing stretching exercises to address bilateral equinus.      - Recommend wearing supportive shoes only.  Discussed avoidance of barefoot walking, flip flops, and Crocs, as this will exacerbate current symptoms.       - Recommend icing the affected areas a minimum of 20 minutes daily.    - Discussed avoidance of high impact activities such as squatting, stooping, and running as these activities will exacerbate symptoms.       - May consider applying a topical analgesic (voltaren cream) to help with pain symptoms.      - Orders written for Astym and dry needling per PT.     - RTC prn or sooner if symptoms fail to resolve within 6 weeks.  Will consider a corticosteroid injection at that time.     Rhett Jasmine DPM          [1]   Current Outpatient Medications   Medication Sig Dispense Refill    ondansetron (ZOFRAN-ODT) 4 MG TbDL DISSOLVE ONE TABLET BY MOUTH EVERY 8 HOURS AS NEEDED FOR NAUSEA 21 tablet 0    oxybutynin (DITROPAN-XL) 5 MG TR24 Take 1 tablet (5 mg total) by mouth once daily. 30 tablet 11    semaglutide, weight loss, 0.5 mg/0.5 mL PnIj Inject 0.5 mg into the skin every 7 days.      tretinoin (RETIN-A) 0.025 % cream SMARTSIG:Sparingly Topical Every Night      valacyclovir (VALTREX) 1000 MG tablet Take 1 tablet (1,000 mg total) by mouth once daily. 30 tablet 11    valACYclovir (VALTREX) 500 MG tablet Take 500 mg by mouth.      varenicline (CHANTIX) 1 mg Tab Take 1 tablet (1 mg total) by mouth once daily. 30 tablet 11     No current facility-administered medications for this visit.

## 2025-05-14 ENCOUNTER — PATIENT MESSAGE (OUTPATIENT)
Dept: PODIATRY | Facility: CLINIC | Age: 45
End: 2025-05-14
Payer: COMMERCIAL

## 2025-05-16 ENCOUNTER — OFFICE VISIT (OUTPATIENT)
Dept: OBSTETRICS AND GYNECOLOGY | Facility: CLINIC | Age: 45
End: 2025-05-16
Payer: COMMERCIAL

## 2025-05-16 VITALS
HEIGHT: 65 IN | WEIGHT: 175.69 LBS | DIASTOLIC BLOOD PRESSURE: 74 MMHG | BODY MASS INDEX: 29.27 KG/M2 | SYSTOLIC BLOOD PRESSURE: 116 MMHG

## 2025-05-16 DIAGNOSIS — Z01.419 WELL WOMAN EXAM WITH ROUTINE GYNECOLOGICAL EXAM: Primary | ICD-10-CM

## 2025-05-16 DIAGNOSIS — Z12.31 BREAST CANCER SCREENING BY MAMMOGRAM: ICD-10-CM

## 2025-05-16 DIAGNOSIS — R39.15 URINARY URGENCY: ICD-10-CM

## 2025-05-16 DIAGNOSIS — N32.81 OAB (OVERACTIVE BLADDER): ICD-10-CM

## 2025-05-16 DIAGNOSIS — A60.00 GENITAL HERPES SIMPLEX, UNSPECIFIED SITE: ICD-10-CM

## 2025-05-16 DIAGNOSIS — Z12.4 CERVICAL CANCER SCREENING: ICD-10-CM

## 2025-05-16 PROCEDURE — 99999 PR PBB SHADOW E&M-EST. PATIENT-LVL III: CPT | Mod: PBBFAC,,,

## 2025-05-16 RX ORDER — OXYBUTYNIN CHLORIDE 5 MG/1
5 TABLET, EXTENDED RELEASE ORAL DAILY
Qty: 30 TABLET | Refills: 11 | Status: SHIPPED | OUTPATIENT
Start: 2025-05-16 | End: 2026-05-16

## 2025-05-16 RX ORDER — VALACYCLOVIR HYDROCHLORIDE 1 G/1
1000 TABLET, FILM COATED ORAL DAILY
Qty: 5 TABLET | Refills: 3 | Status: SHIPPED | OUTPATIENT
Start: 2025-05-16 | End: 2025-05-21

## 2025-05-16 NOTE — PROGRESS NOTES
HISTORY OF PRESENT ILLNESS:    Twyla Ramos is a 44 y.o. female, , No LMP recorded. Patient has had an ablation.,  presents for a routine annual exam . She is a new patient. Reports history of abnormal pap smear in the past with cryo. Last pap . Amenorrheic s/p ablation. Urethral sling last year. Reports symptoms are improved but not totally resolved. Still some urinary incontinence with urge. No longer taking daily Ditropan.    Dialysis nurse at main campus     Last pap:  - NILM   Last mammogram:  NL TC 8.45%  Last colon ca screening:  n/a   SA: male partner, does not use condoms  Contraception: tubal ligation.    Sexually transmitted infection risk: very low risk of STD exposure.   This is the extent of the patient's complaints at this time.     Past Medical History:   Diagnosis Date    Abnormal Pap smear     cervical laser/HPV    Herpes genital        Past Surgical History:   Procedure Laterality Date    CHOLECYSTECTOMY      CYSTOSCOPY N/A 2024    Procedure: CYSTOSCOPY;  Surgeon: Neptali Briseno MD;  Location: [facility] OR;  Service: Urology;  Laterality: N/A;    left shoulder and elbow surgery  2012    PARTIAL NEPHRECTOMY Right 2019    polyp removal      colon age 5 and 10    THYROIDECTOMY, PARTIAL  2001    TUBAL LIGATION      URETHROPEXY USING MIDURETHRAL SLING BY RETROPUBIC APPROACH N/A 2024    Procedure: URETHROPEXY, RETROPUBIC APPROACH, USING MIDURETHRAL SLING;  Surgeon: Neptali Briseno MD;  Location: [facility] OR;  Service: Urology;  Laterality: N/A;  1 hour 20 minutes       MEDICATIONS AND ALLERGIES:    Current Medications[1]    Review of patient's allergies indicates:   Allergen Reactions    Azithromycin Diarrhea       Family History   Problem Relation Name Age of Onset    Breast cancer Neg Hx      Ovarian cancer Neg Hx      Stroke Neg Hx      Hypertension Neg Hx      Colon cancer Paternal Grandmother         Social History[2]    OB History    Para  "Term  AB Living   2 2 2   2   SAB IAB Ectopic Multiple Live Births       2      # Outcome Date GA Lbr Eric/2nd Weight Sex Type Anes PTL Lv   2 Term    3.062 kg (6 lb 12 oz) F Vag-Spont EPI N VASQUEZ   1 Term    3.827 kg (8 lb 7 oz) M Vag-Spont EPI N VASQUEZ          COMPREHENSIVE GYN HISTORY:  PAP History: + abnormal Paps.  Infection History: Denies STDs. Denies PID.  Benign History: Denies uterine fibroids. Denies ovarian cysts. Denies endometriosis. Denies other conditions.  Cancer History: Denies cervical cancer. Denies uterine cancer or hyperplasia. Denies ovarian cancer. Denies vulvar cancer or pre-cancer. Denies vaginal cancer or pre-cancer. Denies breast cancer. Denies colon cancer.      ROS:  GENERAL: No weight changes. No swelling. No fatigue. No fever.  BREASTS: No pain. No lumps. No discharge.  ABDOMEN: No pain. No nausea. No vomiting. No diarrhea. No constipation.  REPRODUCTIVE: No abnormal bleeding. No pelvic pain.   VULVA: No pain. No lesions. No itching.  VAGINA: No relaxation. No itching. No odor. No discharge. No lesions.  URINARY: No incontinence. No nocturia. No frequency. No dysuria.    /74 (Patient Position: Sitting)   Ht 5' 5" (1.651 m)   Wt 79.7 kg (175 lb 11.3 oz)   BMI 29.24 kg/m²     PE:  Physical Exam:   Constitutional: She is oriented to person, place, and time. She appears well-developed and well-nourished. No distress.    HENT:   Head: Normocephalic.    Eyes: Pupils are equal, round, and reactive to light.      Pulmonary/Chest: Effort normal. No respiratory distress. She exhibits no mass, no tenderness, no laceration, no edema, no deformity, no swelling and no retraction. Right breast exhibits no inverted nipple, no mass, no nipple discharge, no skin change, no tenderness, no bleeding and no swelling. Left breast exhibits no inverted nipple, no mass, no nipple discharge, no skin change, no tenderness, no bleeding and no swelling.        Abdominal: Soft. She exhibits no " distension. There is no abdominal tenderness.     Genitourinary:    Inguinal canal, vagina, uterus, right adnexa and left adnexa normal.      Pelvic exam was performed with patient supine.   The external female genitalia was normal.     Labial bartholins normal.Cervix is normal. Vagina exhibits no lesion. No erythema, vaginal discharge, tenderness or bleeding in the vagina.    No signs of injury in the vagina.      pap smear completedUterus consistancy normal and Uerus contour normal  Uterus is not enlarged and not tender.           Musculoskeletal: Normal range of motion and moves all extremeties.       Neurological: She is alert and oriented to person, place, and time.    Skin: Skin is warm and dry.    Psychiatric: She has a normal mood and affect. Judgment and thought content normal.        PROCEDURES/ORDERS:  Pap      Assessment/Plan:    Well woman exam with routine gynecological exam  -     Liquid-Based Pap Smear, Screening    Breast cancer screening by mammogram  -     Mammo Digital Screening Bilat w/ Obed (XPD); Future; Expected date: 05/16/2025    Cervical cancer screening  -     Liquid-Based Pap Smear, Screening    Urinary urgency  -     oxybutynin (DITROPAN-XL) 5 MG TR24; Take 1 tablet (5 mg total) by mouth once daily.  Dispense: 30 tablet; Refill: 11    OAB (overactive bladder)  -     oxybutynin (DITROPAN-XL) 5 MG TR24; Take 1 tablet (5 mg total) by mouth once daily.  Dispense: 30 tablet; Refill: 11    Genital herpes simplex, unspecified site  -     valACYclovir (VALTREX) 1000 MG tablet; Take 1 tablet (1,000 mg total) by mouth once daily. for 5 days  Dispense: 5 tablet; Refill: 3    - Recommend resuming Ditropan. If no improvement in symptoms, follow up here or with urology    COUNSELING:  The patient was counseled today on:  -A.C.S. Pap and pelvic exam guidelines, recomendations for yearly mammogram, monthly self breast exams and to follow up with her PCP for other health maintenance.    FOLLOW-UP   annually for [employer].          [1]   Current Outpatient Medications:     ondansetron (ZOFRAN-ODT) 4 MG TbDL, DISSOLVE ONE TABLET BY MOUTH EVERY 8 HOURS AS NEEDED FOR NAUSEA, Disp: 21 tablet, Rfl: 0    semaglutide, weight loss, 0.5 mg/0.5 mL PnIj, Inject 0.5 mg into the skin every 7 days., Disp: , Rfl:     valacyclovir (VALTREX) 1000 MG tablet, Take 1 tablet (1,000 mg total) by mouth once daily., Disp: 30 tablet, Rfl: 11    oxybutynin (DITROPAN-XL) 5 MG TR24, Take 1 tablet (5 mg total) by mouth once daily., Disp: 30 tablet, Rfl: 11    tretinoin (RETIN-A) 0.025 % cream, SMARTSIG:Sparingly Topical Every Night (Patient not taking: Reported on 5/16/2025), Disp: , Rfl:     valACYclovir (VALTREX) 1000 MG tablet, Take 1 tablet (1,000 mg total) by mouth once daily. for 5 days, Disp: 5 tablet, Rfl: 3    varenicline (CHANTIX) 1 mg Tab, Take 1 tablet (1 mg total) by mouth once daily. (Patient not taking: Reported on 5/16/2025), Disp: 30 tablet, Rfl: 11  [2]   Social History  Socioeconomic History    Marital status: Single   Tobacco Use    Smoking status: Every Day     Current packs/day: 0.50     Average packs/day: 0.5 packs/day for 13.0 years (6.5 ttl pk-yrs)     Types: Cigarettes    Smokeless tobacco: Never   Substance and Sexual Activity    Alcohol use: Yes     Comment: Socially    Drug use: No    Sexual activity: Yes     Partners: Male     Birth control/protection: Surgical     Social Drivers of Health     Financial Resource Strain: Medium Risk (11/22/2022)    Received from [facility]    Financial Resource Strain     Financial Concerns: 2   Transportation Needs: Unknown (11/22/2022)    Received from [facility]    Transportation Needs     Lack of Transportation (Medical): 0   Physical Activity: Unknown (11/22/2022)    Received from [facility]    Physical Activity     Calculated Minutes of Exercise per Week:: 0   Stress: Unknown (11/22/2022)    Received from  [facility]    Stress     Patient Reported Major Stressor(s): 0     Patient Reported Strengths: 1     Patient Reported Source(s) of Support: 1   Housing Stability: Unknown (11/22/2022)    Received from [facility]    Housing Stability     Housing Concerns: 0

## 2025-06-24 ENCOUNTER — ON-DEMAND VIRTUAL (OUTPATIENT)
Dept: URGENT CARE | Facility: CLINIC | Age: 45
End: 2025-06-24
Payer: COMMERCIAL

## 2025-06-24 DIAGNOSIS — J32.9 SINUSITIS, UNSPECIFIED CHRONICITY, UNSPECIFIED LOCATION: Primary | ICD-10-CM

## 2025-06-24 PROCEDURE — 98005 SYNCH AUDIO-VIDEO EST LOW 20: CPT | Mod: CC,95,, | Performed by: NURSE PRACTITIONER

## 2025-06-24 RX ORDER — BENZONATATE 200 MG/1
200 CAPSULE ORAL 3 TIMES DAILY PRN
Qty: 15 CAPSULE | Refills: 0 | Status: SHIPPED | OUTPATIENT
Start: 2025-06-24 | End: 2025-06-29

## 2025-06-24 RX ORDER — PREDNISONE 20 MG/1
20 TABLET ORAL 2 TIMES DAILY
Qty: 6 TABLET | Refills: 0 | Status: SHIPPED | OUTPATIENT
Start: 2025-06-24 | End: 2025-06-27

## 2025-06-24 RX ORDER — PROMETHAZINE HYDROCHLORIDE AND DEXTROMETHORPHAN HYDROBROMIDE 6.25; 15 MG/5ML; MG/5ML
5 SYRUP ORAL EVERY 6 HOURS PRN
Qty: 100 ML | Refills: 0 | Status: SHIPPED | OUTPATIENT
Start: 2025-06-24 | End: 2025-06-29

## 2025-06-24 NOTE — LETTER
June 24, 2025      Virtual Visit - Urgent Care  [address] [facility] [id number]       Patient: Twyla Ramos   YOB: 1980  Date of Visit: 06/24/2025    To Whom It May Concern:    Jolie Ramos  was at [facility] on 06/24/2025. The patient may return to work/school on 6/25/25. If you have any questions or concerns, or if I can be of further assistance, please do not hesitate to contact me.    Sincerely,    Vane Chen, NP

## 2025-06-24 NOTE — PROGRESS NOTES
Subjective:      Patient ID: Twyla Ramos is a 45 y.o. female.    Vitals:  vitals were not taken for this visit.     Chief Complaint: Nasal Congestion      Visit Type: TELE AUDIOVISUAL - This visit was conducted virtually based on  subjective information and limited objective exam    Present with the patient at the time of consultation: TELEMED PRESENT WITH PATIENT: None  LOCATION OF PATIENT [facility]  Two patient identifiers used to verify patient- saying out date of birth and full name.       Past Medical History:   Diagnosis Date    Abnormal Pap smear 2001    cervical laser/HPV    Herpes genital      Past Surgical History:   Procedure Laterality Date    CHOLECYSTECTOMY      CYSTOSCOPY N/A 7/25/2024    Procedure: CYSTOSCOPY;  Surgeon: Neptali Briseno MD;  Location: [facility] OR;  Service: Urology;  Laterality: N/A;    left shoulder and elbow surgery  01/01/2012    PARTIAL NEPHRECTOMY Right 2019    polyp removal      colon age 5 and 10    THYROIDECTOMY, PARTIAL  01/01/2001    TUBAL LIGATION      URETHROPEXY USING MIDURETHRAL SLING BY RETROPUBIC APPROACH N/A 7/25/2024    Procedure: URETHROPEXY, RETROPUBIC APPROACH, USING MIDURETHRAL SLING;  Surgeon: Neptali Briseno MD;  Location: [facility] OR;  Service: Urology;  Laterality: N/A;  1 hour 20 minutes     Review of patient's allergies indicates:   Allergen Reactions    Azithromycin Diarrhea     Medications Ordered Prior to Encounter[1]  Family History   Problem Relation Name Age of Onset    Breast cancer Neg Hx      Ovarian cancer Neg Hx      Stroke Neg Hx      Hypertension Neg Hx      Colon cancer Paternal Grandmother         Medications Ordered                [id number] - [place] - [id number]   [id number] [place] [id number]    Telephone: [phone]   Fax: [phone]   Hours: Not open 24 hours                         E-Prescribed (3 of 3)              benzonatate (TESSALON) 200 MG capsule    Sig: Take 1 capsule (200 mg total) by mouth  3 (three) times daily as needed for Cough.       Start: 6/24/25     Quantity: 15 capsule Refills: 0                         predniSONE (DELTASONE) 20 MG tablet    Sig: Take 1 tablet (20 mg total) by mouth 2 (two) times daily. for 3 days       Start: 6/24/25     Quantity: 6 tablet Refills: 0                         promethazine-dextromethorphan (PROMETHAZINE-DM) 6.25-15 mg/5 mL Syrp    Sig: Take 5 mLs by mouth every 6 (six) hours as needed (cough).       Start: 6/24/25     Quantity: 100 mL Refills: 0                           Ohs Peq Odvv Intake    6/24/2025  3:16 PM CDT - Filed by Patient   What is your current physical address in the event of a medical emergency? [id number] [address] [facility] [address]   Are you able to take your vital signs? No   Please attach any relevant images or files    Is your employer contracted with [facility] [employer]? Yes   Please enter your employer supplied coupon code. [facility]         44 yo female with history of tobacco use complains of sinus congestion, productive cough and nausea x 3 days. + chills. Denies SOB and wheezing. No covid or flu exposure. Reports taking zofran for nausea, denies additional medications for relief.          Constitution: Negative for chills and fever.   HENT:  Positive for congestion, postnasal drip and sore throat. Negative for ear pain and ear discharge.    Neck: neck negative.   Cardiovascular:  Negative for chest pain.   Eyes: Negative.    Respiratory:  Positive for cough. Negative for sputum production, shortness of breath, wheezing and asthma.    Gastrointestinal: Negative.    Endocrine: negative.   Genitourinary: Negative.    Musculoskeletal: Negative.    Skin: Negative.    Allergic/Immunologic: Negative for asthma.   Neurological:  Negative for headaches.        Objective:   The physical exam was conducted virtually.    AAO x 3 ; no acute distress noted; appearance normal; mood and behavior normal; thought process normal  Head-  normocephalic  Nose- appears normal, no discharge or erythema  Eyes- pupils appear normal in size, no drainage, no erythema  Ears- normal appearing; no discharge, no erythema  Mouth- appears normal  Oropharynx- no erythema, lesions  Lungs- breathing at a normal rate, no acute distress noted  Heart- no reports of tachycardia, palpitations, chest pain  Abdomen- non distended, non tender reported by patient  Skin- warm and dry, no erythema or edema noted by patient or visualized        Assessment:     1. Sinusitis, unspecified chronicity, unspecified location        Plan:   Increase hydration, rest.   Phenergan cough syrup may cause drowsiness. Do not take when driving or operating machinery.   Follow up with primary care in 2-3 days.       Thank you for choosing [facility]!    Our goal in the [facility] is to always provide outstanding medical care. You may receive a survey by mail or e-mail in the next week regarding your experience today. We would greatly appreciate you completing and returning the survey. Your feedback provides us with a way to recognize our staff who provide very good care, and it helps us learn how to improve when your experience was below our aspiration of excellence.         We appreciate you trusting us with your medical care. We hope you feel better soon. We will be happy to take care of you for all of your future medical needs.    You must understand that you've received an Urgent Care treatment only and that you may be released before all your medical problems are known or treated. You, the patient, will arrange for follow up care as instructed.    Follow up with your PCP or specialty clinic as directed in the next 1-2 weeks if not improved or as needed.  You can call [phone] to schedule an appointment with the appropriate provider.    If your condition worsens we recommend that you receive another evaluation in person, with your primary care  provider, urgent care or at the emergency room immediately or contact your primary medical clinics after hours call service to discuss your concerns.         Sinusitis, unspecified chronicity, unspecified location  -     promethazine-dextromethorphan (PROMETHAZINE-DM) 6.25-15 mg/5 mL Syrp; Take 5 mLs by mouth every 6 (six) hours as needed (cough).  Dispense: 100 mL; Refill: 0  -     benzonatate (TESSALON) 200 MG capsule; Take 1 capsule (200 mg total) by mouth 3 (three) times daily as needed for Cough.  Dispense: 15 capsule; Refill: 0  -     predniSONE (DELTASONE) 20 MG tablet; Take 1 tablet (20 mg total) by mouth 2 (two) times daily. for 3 days  Dispense: 6 tablet; Refill: 0                          [1]   Current Outpatient Medications on File Prior to Visit   Medication Sig Dispense Refill    ondansetron (ZOFRAN-ODT) 4 MG TbDL DISSOLVE ONE TABLET BY MOUTH EVERY 8 HOURS AS NEEDED FOR NAUSEA 21 tablet 0    oxybutynin (DITROPAN-XL) 5 MG TR24 Take 1 tablet (5 mg total) by mouth once daily. 30 tablet 11    semaglutide, weight loss, 0.5 mg/0.5 mL PnIj Inject 0.5 mg into the skin every 7 days.      tretinoin (RETIN-A) 0.025 % cream SMARTSIG:Sparingly Topical Every Night (Patient not taking: Reported on 5/16/2025)      valacyclovir (VALTREX) 1000 MG tablet Take 1 tablet (1,000 mg total) by mouth once daily. 30 tablet 11     No current facility-administered medications on file prior to visit.

## (undated) DEVICE — CLIPPER BLADE MOD 4406 (CAREF)

## (undated) DEVICE — Device

## (undated) DEVICE — DRAPE LAP T SHT W/ INSTR PAD

## (undated) DEVICE — TOWEL OR XRAY BLUE 17X26IN

## (undated) DEVICE — TRAY CATH 1-LYR URIMTR 16FR

## (undated) DEVICE — LUBRICANT SURGILUBE 2 OZ

## (undated) DEVICE — DRAPE STERI INSTRUMENT 1018

## (undated) DEVICE — NDL HYPO REG 25G X 1 1/2

## (undated) DEVICE — ELECTRODE REM PLYHSV RETURN 9

## (undated) DEVICE — DRAPE UINDERBUT GRAD PCH

## (undated) DEVICE — BANDAGE ROLL COTTN 4.5INX4.1YD

## (undated) DEVICE — MARCAINE EPI INJ SDV .25% 10ML

## (undated) DEVICE — SUT 2-0 VICRYL / SH (J417)

## (undated) DEVICE — SET IRR URLGY 2LINE UNIV SPIKE

## (undated) DEVICE — SOL NS 1000CC

## (undated) DEVICE — BANDAGE ADHESIVE PLAS STRL 1X3

## (undated) DEVICE — SUT MONOCRYL 3-0 PS-2 UND

## (undated) DEVICE — SUT MONOCRYL 3-0 RB1

## (undated) DEVICE — RETRACTOR STERILE PLASTIC G2

## (undated) DEVICE — SUT VICRYL PLUS 0 CT1 18IN

## (undated) DEVICE — HOOK STAY ELAS 5MM 8EA/PK

## (undated) DEVICE — TRAY MINOR GEN SURG OMC